# Patient Record
Sex: MALE | Race: WHITE | NOT HISPANIC OR LATINO | Employment: STUDENT | URBAN - METROPOLITAN AREA
[De-identification: names, ages, dates, MRNs, and addresses within clinical notes are randomized per-mention and may not be internally consistent; named-entity substitution may affect disease eponyms.]

---
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
## 2019-07-18 ENCOUNTER — OFFICE VISIT (OUTPATIENT)
Dept: SURGERY | Facility: CLINIC | Age: 19
End: 2019-07-18
Payer: COMMERCIAL

## 2019-07-18 VITALS
HEIGHT: 68 IN | HEART RATE: 70 BPM | BODY MASS INDEX: 25.01 KG/M2 | DIASTOLIC BLOOD PRESSURE: 70 MMHG | SYSTOLIC BLOOD PRESSURE: 116 MMHG | WEIGHT: 165 LBS

## 2019-07-18 DIAGNOSIS — M53.3 PAIN IN THE COCCYX: Primary | ICD-10-CM

## 2019-07-18 PROCEDURE — 99203 OFFICE O/P NEW LOW 30 MIN: CPT | Performed by: SURGERY

## 2019-07-18 RX ORDER — ALBUTEROL SULFATE 90 UG/1
2 AEROSOL, METERED RESPIRATORY (INHALATION) PRN
COMMUNITY

## 2019-07-18 ASSESSMENT — MIFFLIN-ST. JEOR: SCORE: 1732.94
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
## 2020-06-18 ENCOUNTER — OFFICE VISIT (OUTPATIENT)
Dept: URGENT CARE | Facility: URGENT CARE | Age: 20
End: 2020-06-18
Payer: COMMERCIAL

## 2020-06-18 VITALS
HEIGHT: 68 IN | HEART RATE: 68 BPM | DIASTOLIC BLOOD PRESSURE: 62 MMHG | WEIGHT: 183 LBS | OXYGEN SATURATION: 98 % | SYSTOLIC BLOOD PRESSURE: 119 MMHG | BODY MASS INDEX: 27.74 KG/M2 | TEMPERATURE: 97.7 F

## 2020-06-18 DIAGNOSIS — K13.0 CYST OF LIP: Primary | ICD-10-CM

## 2020-06-18 PROCEDURE — 99203 OFFICE O/P NEW LOW 30 MIN: CPT | Performed by: NURSE PRACTITIONER

## 2020-06-18 RX ORDER — DOXYCYCLINE HYCLATE 100 MG
100 TABLET ORAL 2 TIMES DAILY
Qty: 14 TABLET | Refills: 0 | Status: SHIPPED | OUTPATIENT
Start: 2020-06-18 | End: 2020-06-25

## 2020-06-18 ASSESSMENT — MIFFLIN-ST. JEOR: SCORE: 1809.58

## 2020-06-18 NOTE — PATIENT INSTRUCTIONS
This looks like a mucoid cyst.   Take the antibiotic with a meal as directed  Follow up with dermatology if not improving2.

## 2023-11-10 ENCOUNTER — OFFICE VISIT (OUTPATIENT)
Dept: URGENT CARE | Facility: URGENT CARE | Age: 23
End: 2023-11-10
Payer: COMMERCIAL

## 2023-11-10 VITALS
TEMPERATURE: 97.8 F | HEART RATE: 68 BPM | OXYGEN SATURATION: 97 % | BODY MASS INDEX: 27.37 KG/M2 | RESPIRATION RATE: 18 BRPM | WEIGHT: 180 LBS | DIASTOLIC BLOOD PRESSURE: 83 MMHG | SYSTOLIC BLOOD PRESSURE: 124 MMHG

## 2023-11-10 DIAGNOSIS — R05.1 ACUTE COUGH: Primary | ICD-10-CM

## 2023-11-10 DIAGNOSIS — Z87.09 HISTORY OF ASTHMA: ICD-10-CM

## 2023-11-10 PROCEDURE — 99204 OFFICE O/P NEW MOD 45 MIN: CPT | Performed by: FAMILY MEDICINE

## 2023-11-10 RX ORDER — PREDNISONE 20 MG/1
20 TABLET ORAL 2 TIMES DAILY
Qty: 10 TABLET | Refills: 0 | Status: SHIPPED | OUTPATIENT
Start: 2023-11-10 | End: 2023-11-15

## 2023-11-10 RX ORDER — ALBUTEROL SULFATE 90 UG/1
2 AEROSOL, METERED RESPIRATORY (INHALATION) EVERY 6 HOURS
Qty: 18 G | Refills: 1 | Status: SHIPPED | OUTPATIENT
Start: 2023-11-10 | End: 2023-12-10

## 2023-11-10 NOTE — PROGRESS NOTES
SUBJECTIVE: Claire Zuleta is a 23 year old male presenting with a chief complaint of cough  and wheeze.  Onset of symptoms was day(s) ago.  Course of illness is worsening.    Current and Associated symptoms: cough - non-productive  Treatment measures tried include Inhaler (name: alb, has alb nebs but hASN'T USED).  Predisposing factors include HX of asthma.    History reviewed. No pertinent past medical history.  Allergies   Allergen Reactions    Cashew Nut Oil Anaphylaxis    Juglans Hives    Peanut Oil Anaphylaxis    Ceftin [Cefuroxime] Rash     Social History     Tobacco Use    Smoking status: Never    Smokeless tobacco: Never   Substance Use Topics    Alcohol use: No       ROS:  SKIN: no rash  GI: no vomiting    OBJECTIVE:  /83 (BP Location: Left arm, Patient Position: Sitting, Cuff Size: Adult Regular)   Pulse 68   Temp 97.8  F (36.6  C) (Oral)   Resp 18   Wt 81.6 kg (180 lb)   SpO2 97%   BMI 27.37 kg/m  GENERAL APPEARANCE: healthy, alert and no distress  EYES: EOMI,  PERRL, conjunctiva clear  HENT: ear canals and TM's normal.  Nose and mouth without ulcers, erythema or lesions  RESP: lungs wheezes  SKIN: no suspicious lesions or rashes      ICD-10-CM    1. Acute cough  R05.1 predniSONE (DELTASONE) 20 MG tablet     albuterol (PROAIR HFA) 108 (90 Base) MCG/ACT inhaler      2. History of asthma  Z87.09 predniSONE (DELTASONE) 20 MG tablet     albuterol (PROAIR HFA) 108 (90 Base) MCG/ACT inhaler        Start home nebs  Fluids/Rest, f/u if worse/not any better

## 2024-01-27 ENCOUNTER — OFFICE VISIT (OUTPATIENT)
Dept: URGENT CARE | Facility: URGENT CARE | Age: 24
End: 2024-01-27
Payer: COMMERCIAL

## 2024-01-27 VITALS
SYSTOLIC BLOOD PRESSURE: 94 MMHG | OXYGEN SATURATION: 94 % | HEART RATE: 111 BPM | TEMPERATURE: 99.2 F | DIASTOLIC BLOOD PRESSURE: 57 MMHG

## 2024-01-27 DIAGNOSIS — R68.89 FLU-LIKE SYMPTOMS: ICD-10-CM

## 2024-01-27 DIAGNOSIS — J02.9 PHARYNGITIS, UNSPECIFIED ETIOLOGY: ICD-10-CM

## 2024-01-27 DIAGNOSIS — J45.21 MILD INTERMITTENT ASTHMA WITH ACUTE EXACERBATION: Primary | ICD-10-CM

## 2024-01-27 DIAGNOSIS — Z20.822 SUSPECTED COVID-19 VIRUS INFECTION: ICD-10-CM

## 2024-01-27 LAB
DEPRECATED S PYO AG THROAT QL EIA: NEGATIVE
FLUAV AG SPEC QL IA: NEGATIVE
FLUBV AG SPEC QL IA: NEGATIVE
GROUP A STREP BY PCR: NOT DETECTED
SARS-COV-2 RNA RESP QL NAA+PROBE: NEGATIVE

## 2024-01-27 PROCEDURE — 87651 STREP A DNA AMP PROBE: CPT | Performed by: FAMILY MEDICINE

## 2024-01-27 PROCEDURE — 87635 SARS-COV-2 COVID-19 AMP PRB: CPT | Performed by: FAMILY MEDICINE

## 2024-01-27 PROCEDURE — 87804 INFLUENZA ASSAY W/OPTIC: CPT | Performed by: FAMILY MEDICINE

## 2024-01-27 PROCEDURE — 99214 OFFICE O/P EST MOD 30 MIN: CPT | Performed by: FAMILY MEDICINE

## 2024-01-27 RX ORDER — PREDNISONE 20 MG/1
40 TABLET ORAL DAILY
Qty: 10 TABLET | Refills: 0 | Status: SHIPPED | OUTPATIENT
Start: 2024-01-27 | End: 2024-02-01

## 2024-01-27 RX ORDER — ALBUTEROL SULFATE 90 UG/1
2 AEROSOL, METERED RESPIRATORY (INHALATION) EVERY 6 HOURS
Qty: 18 G | Refills: 0 | Status: SHIPPED | OUTPATIENT
Start: 2024-01-27

## 2024-01-27 RX ORDER — DOXYCYCLINE HYCLATE 100 MG
100 TABLET ORAL 2 TIMES DAILY
Qty: 20 TABLET | Refills: 0 | Status: SHIPPED | OUTPATIENT
Start: 2024-01-27 | End: 2024-02-06

## 2024-01-27 RX ORDER — BENZONATATE 200 MG/1
200 CAPSULE ORAL 3 TIMES DAILY PRN
Qty: 30 CAPSULE | Refills: 0 | Status: SHIPPED | OUTPATIENT
Start: 2024-01-27

## 2024-01-27 NOTE — PATIENT INSTRUCTIONS
Start doxycycline-an antibiotic  2 times a day for 10 days always take with food to prevent nausea vomiting for your wheezing/cough, worsening asthma      Start prednisone 2 tablets daily for 5 days-take one now and then second dose in am every day for 5 days, do not take at night -will make it difficult to sleep     Start albuterol inhaler-   2 puffs in a.m. and bedtime and every 4-6 hours as needed for wheezing  if you are having a coughing spell you can take 2 puffs as needed to help decrease the cough       Take tessalon perles ( benzonatate) pills for cough up to 3 times a day will not make you sleepy       Take benadryl ( diphenhydramine) at bedtime will help with congestion and cough will make you sleepy      If your COVID test is positive, please call the nurse line-the phone number is in the upper right hand corner of you after visit summary handout given to you at the end of your appointment,    Tell the nurse line your provider in urgent care said you are a candidate for Paxlovid to treat your COVID based on your medical history   Your day zero of onset of symptoms is 1/26/2024           Return to clinic or to ER if symptoms worsen     Follow up with your primary care provider or clinic in about 2-3 days  if your symptoms do not improve

## 2024-01-27 NOTE — PROGRESS NOTES
ASSESSMENT/PLAN:      ICD-10-CM    1. Mild intermittent asthma with acute exacerbation  J45.21 predniSONE (DELTASONE) 20 MG tablet     doxycycline hyclate (VIBRA-TABS) 100 MG tablet     benzonatate (TESSALON) 200 MG capsule     albuterol (PROAIR HFA/PROVENTIL HFA/VENTOLIN HFA) 108 (90 Base) MCG/ACT inhaler      2. Suspected COVID-19 virus infection  Z20.822 Symptomatic COVID-19 Virus (Coronavirus) by PCR Nose      3. Flu-like symptoms  R68.89 Influenza A & B Antigen - Clinic Collect      4. Pharyngitis, unspecified etiology  J02.9 Streptococcus A Rapid Screen w/Reflex to PCR - Clinic Collect     Group A Streptococcus PCR Throat Swab          Patient Instructions     Start doxycycline-an antibiotic  2 times a day for 10 days always take with food to prevent nausea vomiting for your wheezing/cough, worsening asthma      Start prednisone 2 tablets daily for 5 days-take one now and then second dose in am every day for 5 days, do not take at night -will make it difficult to sleep     Start albuterol inhaler-   2 puffs in a.m. and bedtime and every 4-6 hours as needed for wheezing  if you are having a coughing spell you can take 2 puffs as needed to help decrease the cough       Take tessalon perles ( benzonatate) pills for cough up to 3 times a day will not make you sleepy       Take benadryl ( diphenhydramine) at bedtime will help with congestion and cough will make you sleepy      If your COVID test is positive, please call the nurse line-the phone number is in the upper right hand corner of you after visit summary handout given to you at the end of your appointment,    Tell the nurse line your provider in urgent care said you are a candidate for Paxlovid to treat your COVID based on your medical history   Your day zero of onset of symptoms is 1/26/2024           Return to clinic or to ER if symptoms worsen     Follow up with your primary care provider or clinic in about 2-3 days  if your symptoms do not improve     ED Physician Addendum     Time of Addendum: 0300  Received from:  Ramesh CHAMORRO  Pertinent History: Pending US    Admission on 01/12/2021   Component Date Value Ref Range Status   • COLOR, URINALYSIS 01/12/2021 Yellow   Final   • APPEARANCE, URINALYSIS 01/12/2021 Clear   Final   • GLUCOSE, URINALYSIS 01/12/2021 Negative  Negative mg/dL Final   • BILIRUBIN, URINALYSIS 01/12/2021 Negative  Negative Final   • KETONES, URINALYSIS 01/12/2021 Trace* Negative mg/dL Final   • SPECIFIC GRAVITY, URINALYSIS 01/12/2021 1.025  1.005 - 1.030 Final   • OCCULT BLOOD, URINALYSIS 01/12/2021 Negative  Negative Final   • PH, URINALYSIS 01/12/2021 6.0  5.0 - 7.0 Final   • PROTEIN, URINALYSIS 01/12/2021 Negative  Negative mg/dL Final   • UROBILINOGEN, URINALYSIS 01/12/2021 0.2  0.2, 1.0 mg/dL Final   • NITRITE, URINALYSIS 01/12/2021 Negative  Negative Final   • LEUKOCYTE ESTERASE, URINALYSIS 01/12/2021 Negative  Negative Final   • SQUAMOUS EPITHELIAL, URINALYSIS 01/12/2021 1 to 5  None Seen, 1 to 5 /hpf Final   • ERYTHROCYTES, URINALYSIS 01/12/2021 1 to 2  None Seen, 1 to 2 /hpf Final   • LEUKOCYTES, URINALYSIS 01/12/2021 1 to 5  None Seen, 1 to 5 /hpf Final   • BACTERIA, URINALYSIS 01/12/2021 None Seen  None Seen /hpf Final   • HYALINE CASTS, URINALYSIS 01/12/2021 None Seen  None Seen, 1 to 5 /lpf Final   • COLOR - POINT OF CARE 01/12/2021 Yellow   Final   • APPEARANCE, URINALYSIS - POINT OF * 01/12/2021 Clear   Final   • GLUCOSE, URINALYSIS - POINT OF CARE 01/12/2021 Negative  Negative mg/dL Final   • BILIRUBIN, URINALYSIS - POINT OF C* 01/12/2021 Negative  Negative Final   • KETONES, URINALYSIS - POINT OF CARE 01/12/2021 Negative  Negative mg/dL Final   • SPECIFIC GRAVITY, URINALYSIS - POI* 01/12/2021 1.025  1.005 - 1.030 NULL Final   • OCCULT BLOOD, URINALYSIS - POINT O* 01/12/2021 Trace* Negative Final   • PH, URINALYSIS - POINT OF CARE 01/12/2021 5.5  5.0 - 7.0 Units Final   • PROTEIN, URINALYSIS - POINT OF CARE 01/12/2021 Negative             Reviewed medication instructions and side effects. Follow up if experiences side effects.     I reviewed supportive care, otc meds to use if needed, expected course, and signs of concern.  Follow up as needed or if he does not improve within  1-2 days or if worsens in any way.  Reviewed red flag symptoms and is to go to the ER if experiences any of these.     The use of [facility] services may have been used to construct the content in this note; any grammatical or spelling errors are non-intentional. Please contact the author of this note directly if you are in need of any clarification.                  Patient presents with:  Headache: Since yesterday. Chills, lethargy, body aches, cough.  Cough: Since yesterday. No at home covid testing done.       Subjective     Claire Zuleta is a 24 year old male who presents to clinic today for the following health issues:    HPI       {additional problems for provider to add (Optional): [id number]}  Acute Illness  Acute illness concerns:/duration onset yesterday of myalgias ,chills, cough, sore throat    Symptoms:  Fever: No  Chills/Sweats: YES  Headache (location?): YES  Sinus Pressure: No  Conjunctivitis:  No  Ear Pain: no  Rhinorrhea: YES  Congestion: YES  Sore Throat: YES  Cough: YES-non-productive  Wheeze: YES-hx of asthma but able to wok out at gym  Decreased Appetite: No  Nausea: No  Vomiting: No  Diarrhea: No  Fatigue/Achiness: YES  Sick/Strep Exposure: ?at gym, works from home-virtual  Therapies tried and outcome:   Ibuprofen, DayQuil, NyQuil with minimal relief  Has not taken a COVID test  Vaccinated for COVID  Last COVID 6/20/2022  Smokes cigars approximately 2 cigars a week for over a year          No past medical history on file.  Social History     Tobacco Use    Smoking status: Never    Smokeless tobacco: Never   Substance Use Topics    Alcohol use: No       Current Outpatient Medications   Medication Sig Dispense Refill    albuterol  Negative mg/dL Final   • UROBILINOGEN, URINALYSIS - POINT O* 01/12/2021 0.2  0.2, 1.0 mg/dL Final   • NITRITE, URINALYSIS - POINT OF CARE 01/12/2021 Negative  Negative Final   • WBC ESTERASE, URINALYSIS - POINT O* 01/12/2021 Small* Negative Final        US TESTICLES AND SCROTUM WITH DUPLEX     (Results Pending)      Prelim Report  Ultrasound scrotum:  Findings: There is no evidence of testicular torsion. No significant hydrocele or varicocele is noted. Symmetric flow is noted within the bilateral testicles and epididymis. Testicles appear symmetrically homogeneous. Approximately 3 mm right epididymal cyst.  Impression: No evidence of testicular torsion.      ED Diagnosis   1. Testicular pain, left     2. Inguinal strain, left, initial encounter           Disposition        Discharge 1/12/2021  5:14 AM  Ariel Monterroso discharge to home/self care.             Sukhdev Atkins MD  03/01/21 0211     (PROAIR HFA/PROVENTIL HFA/VENTOLIN HFA) 108 (90 Base) MCG/ACT inhaler Inhale 2 puffs into the lungs every 6 hours 18 g 0    albuterol (PROAIR HFA/PROVENTIL HFA/VENTOLIN HFA) 108 (90 Base) MCG/ACT inhaler Inhale 2 puffs into the lungs as needed for shortness of breath / dyspnea or wheezing      benzonatate (TESSALON) 200 MG capsule Take 1 capsule (200 mg) by mouth 3 times daily as needed for cough 30 capsule 0    doxycycline hyclate (VIBRA-TABS) 100 MG tablet Take 1 tablet (100 mg) by mouth 2 times daily for 10 days 20 tablet 0    predniSONE (DELTASONE) 20 MG tablet Take 2 tablets (40 mg) by mouth daily for 5 days 10 tablet 0    albuterol (PROAIR HFA) 108 (90 Base) MCG/ACT inhaler Inhale 2 puffs into the lungs every 6 hours for 30 days 18 g 1     Allergies   Allergen Reactions    Cashew Nut Oil Anaphylaxis    Juglans Hives    Peanut Oil Anaphylaxis    Ceftin [Cefuroxime] Rash             ROS are negative, except as otherwise noted HPI      Objective    BP 94/57   Pulse 111   Temp 99.2  F (37.3  C) (Oral)   SpO2 94%   There is no height or weight on file to calculate BMI.  Physical Exam       GENERAL:  Alert and oriented x 3.  Minimal distress.  HEENT: Normocephalic, atraumatic. PEERRLA, EOMI.  Scleras, lids and conjunctivae normal. Pinnas, canals and TM's clear.  Nose congestion and oropharynx moist, posterior pharynx moderately injected   NECK: supple and a few shotty anterior chain bilateral adenopathy   CHEST: Diffuse wheezing, no rhonchi or rales. .  HEART:  S1 and S2 normal, no murmurs, clicks, gallops or rubs. Regular rate and rhythm.    SKIN: well perfused without cyanosis or rashes.  NEURO:Alert and oriented x3, normal strength and tone, normal gait      Diagnostic Test Results:  Labs reviewed in Epic  Results for orders placed or performed in visit on 01/27/24   Symptomatic COVID-19 Virus (Coronavirus) by PCR Nose     Status: Normal    Specimen: Nose; Swab   Result Value Ref Range    SARS CoV2 PCR  Negative Negative    Narrative    Testing was performed using the Xpert Xpress SARS-CoV-2 Assay on the Cep[employer] Gene-Xpert Instrument Systems. Additional information about this Emergency Use Authorization (EUA) assay can be found via the Lab Guide. This test should be ordered for the detection of SARS-CoV-2 in individuals who meet SARS-CoV-2 clinical and/or epidemiological criteria as well as from individuals without symptoms or other reasons to suspect COVID-19. Test performance for asymptomatic patients has only been established in anterior nasal swab specimens. This test is for in vitro diagnostic use under the FDA EUA for laboratories certified under CLIA to perform high complexity testing. This test has not been FDA cleared or approved. A negative result does not rule out the presence of PCR inhibitors in the specimen or target RNA concentration below the limit of detection for the assay. The possibility of a false negative should be considered if the patient's recent exposure or clinical presentation suggests COVID-19. This test was validated by [facility]. These Laboratories are certified under the Clinical Laboratory Improvement Amendments (CLIA) as qualified to perform high complexity testing.     Influenza A & B Antigen - Clinic Collect     Status: Normal    Specimen: Nose; Swab   Result Value Ref Range    Influenza A antigen Negative Negative    Influenza B antigen Negative Negative    Narrative    Test results must be correlated with clinical data. If necessary, results should be confirmed by a molecular assay or viral culture.   Streptococcus A Rapid Screen w/Reflex to PCR - Clinic Collect     Status: Normal    Specimen: Throat; Swab   Result Value Ref Range    Group A Strep antigen Negative Negative   Group A Streptococcus PCR Throat Swab     Status: Normal    Specimen: Throat; Swab   Result Value Ref Range    Group A strep by PCR Not Detected Not Detected    Narrative    The Xpert  Xpress Strep A test, performed on the [employer]  Instrument Systems, is a rapid, qualitative in vitro diagnostic test for the detection of Streptococcus pyogenes (Group A ß-hemolytic Streptococcus, Strep A) in throat swab specimens from patients with signs and symptoms of pharyngitis. The Xpert Xpress Strep A test can be used as an aid in the diagnosis of Group A Streptococcal pharyngitis. The assay is not intended to monitor treatment for Group A Streptococcus infections. The Xpert Xpress Strep A test utilizes an automated real-time polymerase chain reaction (PCR) to detect Streptococcus pyogenes DNA.

## 2024-04-07 ENCOUNTER — HEALTH MAINTENANCE LETTER (OUTPATIENT)
Age: 24
End: 2024-04-07

## 2025-04-19 ENCOUNTER — HEALTH MAINTENANCE LETTER (OUTPATIENT)
Age: 25
End: 2025-04-19